# Patient Record
Sex: FEMALE | Race: WHITE | NOT HISPANIC OR LATINO | URBAN - METROPOLITAN AREA
[De-identification: names, ages, dates, MRNs, and addresses within clinical notes are randomized per-mention and may not be internally consistent; named-entity substitution may affect disease eponyms.]

---

## 2019-04-09 ENCOUNTER — APPOINTMENT (RX ONLY)
Dept: URBAN - METROPOLITAN AREA CLINIC 348 | Facility: CLINIC | Age: 64
Setting detail: DERMATOLOGY
End: 2019-04-09

## 2019-04-09 DIAGNOSIS — D485 NEOPLASM OF UNCERTAIN BEHAVIOR OF SKIN: ICD-10-CM

## 2019-04-09 DIAGNOSIS — L30.9 DERMATITIS, UNSPECIFIED: ICD-10-CM

## 2019-04-09 PROBLEM — D37.01 NEOPLASM OF UNCERTAIN BEHAVIOR OF LIP: Status: ACTIVE | Noted: 2019-04-09

## 2019-04-09 PROBLEM — L85.3 XEROSIS CUTIS: Status: ACTIVE | Noted: 2019-04-09

## 2019-04-09 PROCEDURE — 99202 OFFICE O/P NEW SF 15 MIN: CPT | Mod: 25

## 2019-04-09 PROCEDURE — ? COUNSELING

## 2019-04-09 PROCEDURE — ? ORDER TESTS

## 2019-04-09 PROCEDURE — ? SHAVE REMOVAL

## 2019-04-09 PROCEDURE — ? OTHER

## 2019-04-09 PROCEDURE — 11310 SHAVE SKIN LESION 0.5 CM/<: CPT

## 2019-04-09 ASSESSMENT — LOCATION DETAILED DESCRIPTION DERM
LOCATION DETAILED: LEFT PROXIMAL DORSAL FOREARM
LOCATION DETAILED: LEFT INFERIOR VERMILION LIP
LOCATION DETAILED: RIGHT PROXIMAL DORSAL FOREARM

## 2019-04-09 ASSESSMENT — LOCATION SIMPLE DESCRIPTION DERM
LOCATION SIMPLE: LEFT LIP
LOCATION SIMPLE: LEFT FOREARM
LOCATION SIMPLE: RIGHT FOREARM

## 2019-04-09 ASSESSMENT — LOCATION ZONE DERM
LOCATION ZONE: LIP
LOCATION ZONE: ARM

## 2019-04-09 NOTE — PROCEDURE: SHAVE REMOVAL
Render Post-Care Instructions In Note?: no
Wound Care: Aquaphor
Consent was obtained from the patient. The risks and benefits to therapy were discussed in detail. Specifically, the risks of infection, scarring, bleeding, prolonged wound healing, incomplete removal, allergy to anesthesia, nerve injury and recurrence were addressed. Prior to the procedure, the treatment site was clearly identified and confirmed by the patient. All components of Universal Protocol/PAUSE Rule completed.
Anesthesia Volume In Cc: 0.3
Was A Bandage Applied: Yes
Size Of Lesion In Cm (Required): 0.1
Medical Necessity Clause: This procedure was medically necessary because the lesion that was treated was:
X Size Of Lesion In Cm (Optional): 0
Medical Necessity Information: It is in your best interest to select a reason for this procedure from the list below. All of these items fulfill various CMS LCD requirements except the new and changing color options.
Hemostasis: Aluminum Chloride
Notification Instructions: Pt understands to return to office in 2-4 weeks for biopsy results
Anesthesia Type: 1% lidocaine with 1:100,000 epinephrine
Billing Type: Third-Party Bill
Detail Level: Detailed
Post-Care Instructions: I reviewed with the patient in detail post-care instructions. Patient is to keep the biopsy site dry overnight, and then apply aquaphor twice daily until healed. Patient may apply hydrogen peroxide soaks to remove any crusting.
Path Notes (To The Dermatopathologist): Please check margins.

## 2019-04-09 NOTE — PROCEDURE: OTHER
Detail Level: Detailed
Other (Free Text): Pt recently had labs done. She will verify w/ her PCP if they tested for ANGELO. Lab slip was given today for ANGELO w/ reflex if she had not had that done.
Note Text (......Xxx Chief Complaint.): This diagnosis correlates with the

## 2019-05-07 ENCOUNTER — APPOINTMENT (RX ONLY)
Dept: URBAN - METROPOLITAN AREA CLINIC 348 | Facility: CLINIC | Age: 64
Setting detail: DERMATOLOGY
End: 2019-05-07

## 2019-05-07 DIAGNOSIS — D22 MELANOCYTIC NEVI: ICD-10-CM

## 2019-05-07 DIAGNOSIS — L30.9 DERMATITIS, UNSPECIFIED: ICD-10-CM | Status: IMPROVED

## 2019-05-07 PROBLEM — D22.0 MELANOCYTIC NEVI OF LIP: Status: ACTIVE | Noted: 2019-05-07

## 2019-05-07 PROCEDURE — ? COUNSELING

## 2019-05-07 PROCEDURE — 99213 OFFICE O/P EST LOW 20 MIN: CPT

## 2019-05-07 ASSESSMENT — LOCATION DETAILED DESCRIPTION DERM
LOCATION DETAILED: RIGHT PROXIMAL DORSAL FOREARM
LOCATION DETAILED: LEFT INFERIOR VERMILION LIP
LOCATION DETAILED: LEFT PROXIMAL DORSAL FOREARM

## 2019-05-07 ASSESSMENT — LOCATION ZONE DERM
LOCATION ZONE: ARM
LOCATION ZONE: LIP

## 2019-05-07 ASSESSMENT — LOCATION SIMPLE DESCRIPTION DERM
LOCATION SIMPLE: LEFT FOREARM
LOCATION SIMPLE: RIGHT FOREARM
LOCATION SIMPLE: LEFT LIP

## 2021-12-02 ENCOUNTER — APPOINTMENT (RX ONLY)
Dept: URBAN - METROPOLITAN AREA CLINIC 348 | Facility: CLINIC | Age: 66
Setting detail: DERMATOLOGY
End: 2021-12-02

## 2021-12-02 DIAGNOSIS — Z41.9 ENCOUNTER FOR PROCEDURE FOR PURPOSES OTHER THAN REMEDYING HEALTH STATE, UNSPECIFIED: ICD-10-CM

## 2021-12-02 DIAGNOSIS — B35.4 TINEA CORPORIS: ICD-10-CM

## 2021-12-02 PROBLEM — L20.84 INTRINSIC (ALLERGIC) ECZEMA: Status: ACTIVE | Noted: 2021-12-02

## 2021-12-02 PROCEDURE — ? TREATMENT REGIMEN

## 2021-12-02 PROCEDURE — ? COSMETIC CONSULTATION: PRODUCTS

## 2021-12-02 PROCEDURE — ? PRESCRIPTION

## 2021-12-02 PROCEDURE — 99213 OFFICE O/P EST LOW 20 MIN: CPT

## 2021-12-02 PROCEDURE — ? COUNSELING

## 2021-12-02 RX ORDER — KETOCONAZOLE 20 MG/G
CREAM TOPICAL BID
Qty: 60 | Refills: 2 | Status: ERX | COMMUNITY
Start: 2021-12-02

## 2021-12-02 RX ADMIN — KETOCONAZOLE: 20 CREAM TOPICAL at 00:00

## 2021-12-02 ASSESSMENT — LOCATION DETAILED DESCRIPTION DERM
LOCATION DETAILED: LEFT INFERIOR CENTRAL MALAR CHEEK
LOCATION DETAILED: LEFT POPLITEAL SKIN
LOCATION DETAILED: RIGHT POPLITEAL SKIN
LOCATION DETAILED: LEFT INFERIOR FOREHEAD

## 2021-12-02 ASSESSMENT — LOCATION SIMPLE DESCRIPTION DERM
LOCATION SIMPLE: LEFT CHEEK
LOCATION SIMPLE: LEFT FOREHEAD
LOCATION SIMPLE: RIGHT POPLITEAL SKIN
LOCATION SIMPLE: LEFT POPLITEAL SKIN

## 2021-12-02 ASSESSMENT — LOCATION ZONE DERM
LOCATION ZONE: LEG
LOCATION ZONE: FACE

## 2021-12-02 NOTE — PROCEDURE: TREATMENT REGIMEN
Detail Level: Zone
Plan: If lesions do not improve after two weeks, patient can call for steroids\\n\\nSensitive skin regime discussed.
Initiate Treatment: Ketoconazole cream - apply to affected areas twice daily for two weeks.

## 2024-06-03 NOTE — PROCEDURE: COUNSELING
----- Message from Rene Arzate MD sent at 6/3/2024  7:26 AM EDT -----  Please schedule an office visit  ----- Message -----  From: Colby Shanks MD  Sent: 6/1/2024  11:16 AM EDT  To: Rene Arzate MD       Detail Level: Simple